# Patient Record
Sex: FEMALE | Race: WHITE
[De-identification: names, ages, dates, MRNs, and addresses within clinical notes are randomized per-mention and may not be internally consistent; named-entity substitution may affect disease eponyms.]

---

## 2024-01-24 ENCOUNTER — HOSPITAL ENCOUNTER (OUTPATIENT)
Dept: HOSPITAL 101 - LAB | Age: 29
Discharge: HOME | End: 2024-01-24
Payer: COMMERCIAL

## 2024-01-24 DIAGNOSIS — Z01.419: Primary | ICD-10-CM

## 2024-01-24 PROCEDURE — G0145 SCR C/V CYTO,THINLAYER,RESCR: HCPCS

## 2024-02-01 ENCOUNTER — HOSPITAL ENCOUNTER
Age: 29
Discharge: HOME | End: 2024-02-01
Payer: COMMERCIAL

## 2024-02-01 DIAGNOSIS — N63.0: Primary | ICD-10-CM

## 2024-02-01 PROCEDURE — 77066 DX MAMMO INCL CAD BI: CPT

## 2024-02-01 PROCEDURE — G0279 TOMOSYNTHESIS, MAMMO: HCPCS

## 2024-02-01 PROCEDURE — 76642 ULTRASOUND BREAST LIMITED: CPT

## 2024-03-28 NOTE — PROGRESS NOTES
[facility]  [address]  [place] [address]  Phone: [phone]  Fax: [phone]    Abeba Santiago is a 28 y.o. female who presents today for her medical conditions/complaintsas noted below.  Chief Complaint   Patient presents with    Fever     Tmax 103.7 with an ear thermometer. Fever started about 2 days ago.    Cough     Patient states cough started 6 days ago. Patient has not tried any meds other then tylenol. Patient has not tested herself for COVID.    Congestion        HPI:     HPI  She reports having worsening cough and fever   Started with cough  Yesterday fever  Tyelnol and Ibuprofen     Current Outpatient Medications   Medication Sig Dispense Refill    benzonatate (TESSALON) 100 MG capsule Take 1-2 capsules by mouth 3 times daily as needed for Cough 60 capsule 0     No current facility-administered medications for this visit.     No Known Allergies    Subjective:      Review of Systems   Constitutional:  Positive for chills, diaphoresis and fever.   HENT:  Positive for congestion, postnasal drip, sore throat and trouble swallowing.    Respiratory:  Positive for cough, shortness of breath and wheezing.    Gastrointestinal:  Negative for diarrhea, nausea and vomiting.   Musculoskeletal:  Negative for arthralgias and myalgias.   Skin:  Negative for rash.       Objective:     /68   Pulse (!) 117   Temp 99 °F (37.2 °C) (Oral)   Ht 1.575 m (5' 2.01\")   Wt 90.6 kg (199 lb 12.8 oz)   SpO2 99%   BMI 36.53 kg/m²   Physical Exam  Vitals and nursing note reviewed.   Constitutional:       Appearance: She is ill-appearing. She is not toxic-appearing.   HENT:      Right Ear: Tympanic membrane, ear canal and external ear normal.      Left Ear: Tympanic membrane, ear canal and external ear normal.      Nose: Congestion present.      Mouth/Throat:      Mouth: Mucous membranes are moist.      Pharynx: Posterior oropharyngeal erythema present.   Eyes:

## 2024-03-29 ENCOUNTER — OFFICE VISIT (OUTPATIENT)
Dept: PRIMARY CARE CLINIC | Age: 29
End: 2024-03-29

## 2024-03-29 VITALS
TEMPERATURE: 99 F | BODY MASS INDEX: 36.77 KG/M2 | SYSTOLIC BLOOD PRESSURE: 116 MMHG | HEART RATE: 117 BPM | HEIGHT: 62 IN | DIASTOLIC BLOOD PRESSURE: 68 MMHG | OXYGEN SATURATION: 99 % | WEIGHT: 199.8 LBS

## 2024-03-29 DIAGNOSIS — Z00.00 HEALTH CARE MAINTENANCE: ICD-10-CM

## 2024-03-29 DIAGNOSIS — J10.1 INFLUENZA B: Primary | ICD-10-CM

## 2024-03-29 DIAGNOSIS — R05.1 ACUTE COUGH: ICD-10-CM

## 2024-03-29 DIAGNOSIS — R50.9 FEVER, UNSPECIFIED FEVER CAUSE: ICD-10-CM

## 2024-03-29 LAB
INFLUENZA A ANTIGEN, POC: NEGATIVE
INFLUENZA B ANTIGEN, POC: POSITIVE
Lab: NORMAL
QC PASS/FAIL: NORMAL
SARS-COV-2 RDRP RESP QL NAA+PROBE: NEGATIVE
VALID INTERNAL CONTROL, POC: ABNORMAL

## 2024-03-29 RX ORDER — AZITHROMYCIN 250 MG/1
TABLET, FILM COATED ORAL
Qty: 1 PACKET | Refills: 0 | Status: SHIPPED | OUTPATIENT
Start: 2024-03-29 | End: 2024-04-08

## 2024-03-29 RX ORDER — BENZONATATE 100 MG/1
100-200 CAPSULE ORAL 3 TIMES DAILY PRN
Qty: 60 CAPSULE | Refills: 0 | Status: SHIPPED | OUTPATIENT
Start: 2024-03-29 | End: 2024-04-05

## 2024-03-29 ASSESSMENT — ENCOUNTER SYMPTOMS
SHORTNESS OF BREATH: 1
COUGH: 1
SORE THROAT: 1
TROUBLE SWALLOWING: 1
NAUSEA: 0
DIARRHEA: 0
VOMITING: 0
WHEEZING: 1

## 2024-05-13 ENCOUNTER — HOSPITAL ENCOUNTER
Dept: HOSPITAL 101 - US | Age: 29
Discharge: HOME | End: 2024-05-13
Payer: COMMERCIAL

## 2024-05-13 DIAGNOSIS — N92.6: Primary | ICD-10-CM

## 2024-05-13 DIAGNOSIS — Z3A.00: ICD-10-CM

## 2024-05-13 PROCEDURE — 84702 CHORIONIC GONADOTROPIN TEST: CPT

## 2024-05-13 PROCEDURE — 76817 TRANSVAGINAL US OBSTETRIC: CPT

## 2024-05-13 PROCEDURE — 36415 COLL VENOUS BLD VENIPUNCTURE: CPT

## 2024-05-15 ENCOUNTER — HOSPITAL ENCOUNTER
Dept: HOSPITAL 101 - LAB | Age: 29
Discharge: HOME | End: 2024-05-15
Payer: COMMERCIAL

## 2024-05-15 DIAGNOSIS — N92.6: ICD-10-CM

## 2024-05-15 DIAGNOSIS — Z00.00: Primary | ICD-10-CM

## 2024-05-15 DIAGNOSIS — Z00.00 HEALTH CARE MAINTENANCE: ICD-10-CM

## 2024-05-15 DIAGNOSIS — N92.6: Primary | ICD-10-CM

## 2024-05-15 LAB
ALANINE AMINOTRANSFERASE: 56 U/L (ref 14–59)
ALBUMIN GLOBULIN RATIO: 1.1
ALBUMIN LEVEL: 3.5 G/DL (ref 3.4–5)
ALBUMIN SERPL-MCNC: NORMAL G/DL
ALKALINE PHOSPHATASE: 70 U/L (ref 46–116)
ALP BLD-CCNC: NORMAL U/L
ALT SERPL-CCNC: NORMAL U/L
ANION GAP: 13
ASPARTATE AMINO TRANSFERASE: 30 U/L (ref 15–37)
AST SERPL-CCNC: NORMAL U/L
BILIRUB SERPL-MCNC: NORMAL MG/DL
BLOOD UREA NITROGEN: 8 MG/DL (ref 7–18)
BUN BLDV-MCNC: NORMAL MG/DL
CALCIUM SERPL-MCNC: NORMAL MG/DL
CALCIUM: 9 MG/DL (ref 8.5–10.1)
CARBON DIOXIDE: 25.8 MMOL/L (ref 21–32)
CHLORIDE BLD-SCNC: NORMAL MMOL/L
CHLORIDE: 102 MMOL/L (ref 98–107)
CHOLESTEROL, TOTAL: 149 MG/DL
CHOLESTEROL/HDL RATIO: 2.8
CHOLESTEROL: 149 MG/DL (ref ?–200)
CO2: NORMAL
CREAT SERPL-MCNC: NORMAL MG/DL
ESTIMATED GFR (AFRICAN AMERICA: >60 (ref 60–?)
ESTIMATED GFR (NON-AFRICAN AME: >60 (ref 60–?)
GLOBULIN: 3.2 G/DL
GLUCOSE FASTING: 89 MG/DL
GLUCOSE: 89 MG/DL (ref 74–106)
HDL CHOLESTEROL: 54 MG/DL (ref 40–60)
HDLC SERPL-MCNC: 54 MG/DL (ref 35–70)
LDL CHOLESTEROL CALCULATED: 84.4 MG/DL (ref 0–160)
NONHDLC SERPL-MCNC: NORMAL MG/DL
POTASSIUM SERPL-SCNC: NORMAL MMOL/L
POTASSIUM: 3.8 MMOL/L (ref 3.5–5.1)
SODIUM BLD-SCNC: NORMAL MMOL/L
SODIUM: 137 MMOL/L (ref 136–145)
TOTAL PROTEIN: 6.7 G/DL (ref 6.4–8.2)
TOTAL PROTEIN: NORMAL
TRIGL SERPL-MCNC: 53 MG/DL
TRIGLYCERIDES: 53 MG/DL (ref ?–150)
VLDL CHOLESTEROL: 10.6 MG/DL
VLDLC SERPL CALC-MCNC: 10.6 MG/DL

## 2024-05-15 PROCEDURE — 36415 COLL VENOUS BLD VENIPUNCTURE: CPT

## 2024-05-15 PROCEDURE — 80061 LIPID PANEL: CPT

## 2024-05-15 PROCEDURE — 84702 CHORIONIC GONADOTROPIN TEST: CPT

## 2024-05-15 PROCEDURE — 80053 COMPREHEN METABOLIC PANEL: CPT

## 2024-05-20 ENCOUNTER — HOSPITAL ENCOUNTER
Dept: HOSPITAL 101 - US | Age: 29
Discharge: HOME | End: 2024-05-20
Payer: COMMERCIAL

## 2024-05-20 DIAGNOSIS — Z3A.01: ICD-10-CM

## 2024-05-20 DIAGNOSIS — O20.9: Primary | ICD-10-CM

## 2024-05-20 DIAGNOSIS — O36.80X0: ICD-10-CM

## 2024-05-20 PROCEDURE — 76817 TRANSVAGINAL US OBSTETRIC: CPT

## 2024-06-20 ENCOUNTER — HOSPITAL ENCOUNTER
Dept: HOSPITAL 101 - LAB | Age: 29
Discharge: HOME | End: 2024-06-20
Payer: COMMERCIAL

## 2024-06-20 ENCOUNTER — HOSPITAL ENCOUNTER
Dept: HOSPITAL 101 - NOMS | Age: 29
Discharge: HOME | End: 2024-06-20
Payer: COMMERCIAL

## 2024-06-20 DIAGNOSIS — Z3A.12: ICD-10-CM

## 2024-06-20 DIAGNOSIS — Z34.91: Primary | ICD-10-CM

## 2024-06-20 DIAGNOSIS — Z36.0: ICD-10-CM

## 2024-06-20 LAB
ADD MANUAL DIFF: NO
HEMATOCRIT: 39.1 % (ref 36–48)
HEMOGLOBIN: 14 G/DL (ref 12–16)
IMMATURE GRANULOCYTES ABS AUTO: 0.03 10^3/UL (ref 0–0.03)
IMMATURE GRANULOCYTES PCT AUTO: 0.3 % (ref 0–0.5)
LYMPHOCYTES  ABSOLUTE AUTO: 2.3 10^3/UL (ref 1.2–3.8)
MCV RBC: 88.1 FL (ref 81–99)
MEAN CORPUSCULAR HEMOGLOBIN: 31.5 PG (ref 26.7–34)
MEAN CORPUSCULAR HGB CONC: 35.8 G/DL (ref 29.9–35.2)
PLATELET COUNT: 259 10^3/UL (ref 150–450)
RED BLOOD COUNT: 4.44 10^6/UL (ref 4.2–5.4)
WHITE BLOOD COUNT: 10.4 10^3/UL (ref 4–11)

## 2024-06-20 PROCEDURE — 87389 HIV-1 AG W/HIV-1&-2 AB AG IA: CPT

## 2024-06-20 PROCEDURE — 86850 RBC ANTIBODY SCREEN: CPT

## 2024-06-20 PROCEDURE — 87340 HEPATITIS B SURFACE AG IA: CPT

## 2024-06-20 PROCEDURE — 86900 BLOOD TYPING SEROLOGIC ABO: CPT

## 2024-06-20 PROCEDURE — 76817 TRANSVAGINAL US OBSTETRIC: CPT

## 2024-06-20 PROCEDURE — 36415 COLL VENOUS BLD VENIPUNCTURE: CPT

## 2024-06-20 PROCEDURE — 86901 BLOOD TYPING SEROLOGIC RH(D): CPT

## 2024-06-20 PROCEDURE — 83036 HEMOGLOBIN GLYCOSYLATED A1C: CPT

## 2024-06-20 PROCEDURE — 85025 COMPLETE CBC W/AUTO DIFF WBC: CPT

## 2024-06-20 PROCEDURE — 86762 RUBELLA ANTIBODY: CPT

## 2024-06-20 PROCEDURE — 86592 SYPHILIS TEST NON-TREP QUAL: CPT

## 2024-06-20 PROCEDURE — 86803 HEPATITIS C AB TEST: CPT

## 2024-06-20 PROCEDURE — 87086 URINE CULTURE/COLONY COUNT: CPT

## 2024-06-21 LAB — RUBELLA ANTIBODIES, IGG: 1.18 INDEX

## 2024-07-18 ENCOUNTER — HOSPITAL ENCOUNTER
Age: 29
Discharge: HOME | End: 2024-07-18
Payer: COMMERCIAL

## 2024-07-18 DIAGNOSIS — Z36.9: ICD-10-CM

## 2024-07-18 DIAGNOSIS — Z34.92: Primary | ICD-10-CM

## 2024-07-18 LAB — GLUCOSE 1 HOUR: 83 MG/DL (ref ?–130)

## 2024-07-18 PROCEDURE — 82950 GLUCOSE TEST: CPT

## 2024-07-18 PROCEDURE — 36415 COLL VENOUS BLD VENIPUNCTURE: CPT

## 2024-07-18 PROCEDURE — 82105 ALPHA-FETOPROTEIN SERUM: CPT

## 2024-08-15 ENCOUNTER — HOSPITAL ENCOUNTER
Dept: HOSPITAL 101 - NOMS | Age: 29
Discharge: HOME | End: 2024-08-15
Payer: COMMERCIAL

## 2024-08-15 DIAGNOSIS — Z3A.19: ICD-10-CM

## 2024-08-15 DIAGNOSIS — Z36.89: Primary | ICD-10-CM

## 2024-08-15 PROCEDURE — 76817 TRANSVAGINAL US OBSTETRIC: CPT

## 2024-08-15 PROCEDURE — 76805 OB US >/= 14 WKS SNGL FETUS: CPT

## 2024-09-26 ENCOUNTER — HOSPITAL ENCOUNTER
Dept: HOSPITAL 101 - LAB | Age: 29
Discharge: HOME | End: 2024-09-26
Payer: COMMERCIAL

## 2024-09-26 DIAGNOSIS — Z13.1: Primary | ICD-10-CM

## 2024-09-26 DIAGNOSIS — Z3A.25: ICD-10-CM

## 2024-09-26 LAB
ADD MANUAL DIFF: NO
GLUCOSE 1 HOUR: 152 MG/DL (ref ?–130)
HCT VFR BLD CALC: 38.2 % (ref 36–48)
HEMATOCRIT: 38.2 % (ref 36–48)
HEMOGLOBIN: 13.1 G/DL (ref 12–16)
IMMATURE GRANULOCYTES ABS AUTO: 0.12 10^3/UL (ref 0–0.03)
IMMATURE GRANULOCYTES PCT AUTO: 1.1 % (ref 0–0.5)
LYMPHOCYTES  ABSOLUTE AUTO: 2.1 10^3/UL (ref 1.2–3.8)
MCV RBC: 92.7 FL (ref 81–99)
MEAN CORPUSCULAR HEMOGLOBIN: 31.8 PG (ref 26.7–34)
MEAN CORPUSCULAR HGB CONC: 34.3 G/DL (ref 29.9–35.2)
MEAN CORPUSCULAR VOLUME: 92.7 FL (ref 81–99)
PLATELET # BLD: 254 10^3/UL (ref 150–450)
PLATELET COUNT: 254 10^3/UL (ref 150–450)
RED BLOOD COUNT: 4.12 10^6/UL (ref 4.2–5.4)
WBC # BLD: 10.9 10^3/UL (ref 4–11)
WHITE BLOOD COUNT: 10.9 10^3/UL (ref 4–11)

## 2024-09-26 PROCEDURE — 36415 COLL VENOUS BLD VENIPUNCTURE: CPT

## 2024-09-26 PROCEDURE — 85025 COMPLETE CBC W/AUTO DIFF WBC: CPT

## 2024-09-26 PROCEDURE — 82950 GLUCOSE TEST: CPT

## 2024-10-03 ENCOUNTER — HOSPITAL ENCOUNTER
Dept: HOSPITAL 101 - LAB | Age: 29
Discharge: HOME | End: 2024-10-03
Payer: COMMERCIAL

## 2024-10-03 DIAGNOSIS — R73.09: Primary | ICD-10-CM

## 2024-10-03 LAB
GLUCOSE 1 HOUR: 173 MG/DL (ref ?–180)
GLUCOSE 2 HOUR: 162 MG/DL (ref ?–155)
GLUCOSE 3 HOUR: 114 MG/DL (ref ?–140)

## 2024-10-03 PROCEDURE — 36415 COLL VENOUS BLD VENIPUNCTURE: CPT

## 2024-10-03 PROCEDURE — 82951 GLUCOSE TOLERANCE TEST (GTT): CPT

## 2024-10-03 PROCEDURE — 82952 GTT-ADDED SAMPLES: CPT

## 2024-10-24 ENCOUNTER — HOSPITAL ENCOUNTER
Dept: HOSPITAL 101 - NOMS | Age: 29
Discharge: HOME | End: 2024-10-24
Payer: COMMERCIAL

## 2024-10-24 DIAGNOSIS — Z3A.30: ICD-10-CM

## 2024-10-24 DIAGNOSIS — O26.843: Primary | ICD-10-CM

## 2024-10-24 PROCEDURE — 76816 OB US FOLLOW-UP PER FETUS: CPT

## 2024-11-04 ENCOUNTER — HOSPITAL ENCOUNTER
Dept: HOSPITAL 101 - FBCO | Age: 29
Discharge: HOME | End: 2024-11-04
Payer: COMMERCIAL

## 2024-11-04 VITALS — DIASTOLIC BLOOD PRESSURE: 60 MMHG | HEART RATE: 90 BPM | SYSTOLIC BLOOD PRESSURE: 114 MMHG

## 2024-11-04 DIAGNOSIS — O47.03: Primary | ICD-10-CM

## 2024-11-04 PROCEDURE — 59025 FETAL NON-STRESS TEST: CPT

## 2024-11-07 ENCOUNTER — HOSPITAL ENCOUNTER
Dept: HOSPITAL 101 - US | Age: 29
Discharge: HOME | End: 2024-11-07
Payer: COMMERCIAL

## 2024-11-07 VITALS — DIASTOLIC BLOOD PRESSURE: 74 MMHG | SYSTOLIC BLOOD PRESSURE: 130 MMHG | HEART RATE: 89 BPM

## 2024-11-07 DIAGNOSIS — Z3A.32: ICD-10-CM

## 2024-11-07 DIAGNOSIS — O24.419: Primary | ICD-10-CM

## 2024-11-07 PROCEDURE — 76816 OB US FOLLOW-UP PER FETUS: CPT

## 2024-11-07 PROCEDURE — 76818 FETAL BIOPHYS PROFILE W/NST: CPT

## 2024-11-11 ENCOUNTER — HOSPITAL ENCOUNTER
Dept: HOSPITAL 101 - FBCO | Age: 29
Discharge: HOME | End: 2024-11-11
Payer: COMMERCIAL

## 2024-11-11 VITALS — SYSTOLIC BLOOD PRESSURE: 122 MMHG | DIASTOLIC BLOOD PRESSURE: 72 MMHG | HEART RATE: 104 BPM

## 2024-11-11 DIAGNOSIS — O24.419: Primary | ICD-10-CM

## 2024-11-11 PROCEDURE — 59025 FETAL NON-STRESS TEST: CPT

## 2024-11-14 ENCOUNTER — HOSPITAL ENCOUNTER
Dept: HOSPITAL 101 - US | Age: 29
Discharge: HOME | End: 2024-11-14
Payer: COMMERCIAL

## 2024-11-14 VITALS — HEART RATE: 92 BPM | DIASTOLIC BLOOD PRESSURE: 59 MMHG | SYSTOLIC BLOOD PRESSURE: 110 MMHG

## 2024-11-14 DIAGNOSIS — Z3A.33: ICD-10-CM

## 2024-11-14 DIAGNOSIS — O24.419: Primary | ICD-10-CM

## 2024-11-14 PROCEDURE — 76818 FETAL BIOPHYS PROFILE W/NST: CPT

## 2024-11-18 ENCOUNTER — HOSPITAL ENCOUNTER
Dept: HOSPITAL 101 - FBCO | Age: 29
Discharge: HOME | End: 2024-11-18
Payer: COMMERCIAL

## 2024-11-18 VITALS — DIASTOLIC BLOOD PRESSURE: 69 MMHG | SYSTOLIC BLOOD PRESSURE: 126 MMHG | HEART RATE: 81 BPM

## 2024-11-18 DIAGNOSIS — O24.419: Primary | ICD-10-CM

## 2024-11-18 DIAGNOSIS — Z3A.34: ICD-10-CM

## 2024-11-18 PROCEDURE — 59025 FETAL NON-STRESS TEST: CPT

## 2024-11-21 ENCOUNTER — HOSPITAL ENCOUNTER
Dept: HOSPITAL 101 - US | Age: 29
Discharge: HOME | End: 2024-11-21
Payer: COMMERCIAL

## 2024-11-21 VITALS — DIASTOLIC BLOOD PRESSURE: 74 MMHG | SYSTOLIC BLOOD PRESSURE: 121 MMHG | HEART RATE: 100 BPM

## 2024-11-21 DIAGNOSIS — Z3A.34: ICD-10-CM

## 2024-11-21 DIAGNOSIS — O24.419: Primary | ICD-10-CM

## 2024-11-21 PROCEDURE — 76818 FETAL BIOPHYS PROFILE W/NST: CPT

## 2024-11-25 ENCOUNTER — HOSPITAL ENCOUNTER
Dept: HOSPITAL 101 - FBCO | Age: 29
Discharge: HOME | End: 2024-11-25
Payer: COMMERCIAL

## 2024-11-25 VITALS — HEART RATE: 95 BPM | DIASTOLIC BLOOD PRESSURE: 76 MMHG | SYSTOLIC BLOOD PRESSURE: 141 MMHG

## 2024-11-25 DIAGNOSIS — O24.419: Primary | ICD-10-CM

## 2024-11-25 DIAGNOSIS — Z3A.35: ICD-10-CM

## 2024-11-25 PROCEDURE — 59025 FETAL NON-STRESS TEST: CPT

## 2024-11-27 ENCOUNTER — HOSPITAL ENCOUNTER
Dept: HOSPITAL 101 - US | Age: 29
Discharge: HOME | End: 2024-11-27
Payer: COMMERCIAL

## 2024-11-27 VITALS — SYSTOLIC BLOOD PRESSURE: 110 MMHG | HEART RATE: 92 BPM | DIASTOLIC BLOOD PRESSURE: 68 MMHG

## 2024-11-27 DIAGNOSIS — Z3A.35: ICD-10-CM

## 2024-11-27 DIAGNOSIS — O24.419: Primary | ICD-10-CM

## 2024-11-27 PROCEDURE — 76818 FETAL BIOPHYS PROFILE W/NST: CPT

## 2024-12-02 ENCOUNTER — HOSPITAL ENCOUNTER
Dept: HOSPITAL 101 - FBCO | Age: 29
Discharge: HOME | End: 2024-12-02
Payer: COMMERCIAL

## 2024-12-02 VITALS — DIASTOLIC BLOOD PRESSURE: 87 MMHG | SYSTOLIC BLOOD PRESSURE: 135 MMHG | HEART RATE: 96 BPM

## 2024-12-02 DIAGNOSIS — O24.419: Primary | ICD-10-CM

## 2024-12-02 DIAGNOSIS — Z3A.36: ICD-10-CM

## 2024-12-02 PROCEDURE — 59025 FETAL NON-STRESS TEST: CPT

## 2024-12-04 ENCOUNTER — HOSPITAL ENCOUNTER
Dept: HOSPITAL 101 - RAD | Age: 29
Discharge: HOME | End: 2024-12-04
Payer: COMMERCIAL

## 2024-12-04 DIAGNOSIS — Z3A.36: ICD-10-CM

## 2024-12-04 DIAGNOSIS — O26.843: Primary | ICD-10-CM

## 2024-12-04 DIAGNOSIS — O24.419: ICD-10-CM

## 2024-12-04 PROCEDURE — 76816 OB US FOLLOW-UP PER FETUS: CPT

## 2024-12-05 ENCOUNTER — HOSPITAL ENCOUNTER
Dept: HOSPITAL 101 - FBCO | Age: 29
Discharge: HOME | End: 2024-12-05
Payer: COMMERCIAL

## 2024-12-05 ENCOUNTER — HOSPITAL ENCOUNTER (OUTPATIENT)
Dept: HOSPITAL 101 - LAB | Age: 29
Discharge: HOME | End: 2024-12-05
Payer: COMMERCIAL

## 2024-12-05 VITALS — HEART RATE: 90 BPM | SYSTOLIC BLOOD PRESSURE: 127 MMHG | DIASTOLIC BLOOD PRESSURE: 71 MMHG

## 2024-12-05 DIAGNOSIS — O24.419: Primary | ICD-10-CM

## 2024-12-05 DIAGNOSIS — Z34.93: Primary | ICD-10-CM

## 2024-12-05 DIAGNOSIS — Z3A.36: ICD-10-CM

## 2024-12-05 PROCEDURE — 87150 DNA/RNA AMPLIFIED PROBE: CPT

## 2024-12-05 PROCEDURE — 87081 CULTURE SCREEN ONLY: CPT

## 2024-12-05 PROCEDURE — 76818 FETAL BIOPHYS PROFILE W/NST: CPT

## 2024-12-09 ENCOUNTER — HOSPITAL ENCOUNTER
Dept: HOSPITAL 101 - FBCO | Age: 29
Discharge: HOME | End: 2024-12-09
Payer: COMMERCIAL

## 2024-12-09 VITALS — HEART RATE: 103 BPM | DIASTOLIC BLOOD PRESSURE: 76 MMHG | SYSTOLIC BLOOD PRESSURE: 122 MMHG

## 2024-12-09 DIAGNOSIS — Z3A.37: ICD-10-CM

## 2024-12-09 DIAGNOSIS — O24.419: Primary | ICD-10-CM

## 2024-12-09 PROCEDURE — 59025 FETAL NON-STRESS TEST: CPT

## 2024-12-12 ENCOUNTER — HOSPITAL ENCOUNTER
Dept: HOSPITAL 101 - US | Age: 29
Discharge: HOME | End: 2024-12-12
Payer: COMMERCIAL

## 2024-12-12 VITALS — SYSTOLIC BLOOD PRESSURE: 123 MMHG | HEART RATE: 88 BPM | DIASTOLIC BLOOD PRESSURE: 72 MMHG

## 2024-12-12 DIAGNOSIS — O24.419: Primary | ICD-10-CM

## 2024-12-12 DIAGNOSIS — Z3A.37: ICD-10-CM

## 2024-12-12 PROCEDURE — 76818 FETAL BIOPHYS PROFILE W/NST: CPT

## 2024-12-16 ENCOUNTER — HOSPITAL ENCOUNTER (INPATIENT)
Age: 29
LOS: 1 days | Discharge: HOME | End: 2024-12-17
Payer: COMMERCIAL

## 2024-12-16 VITALS — DIASTOLIC BLOOD PRESSURE: 79 MMHG | SYSTOLIC BLOOD PRESSURE: 131 MMHG | HEART RATE: 90 BPM

## 2024-12-16 VITALS — DIASTOLIC BLOOD PRESSURE: 67 MMHG | SYSTOLIC BLOOD PRESSURE: 119 MMHG | HEART RATE: 85 BPM

## 2024-12-16 VITALS — SYSTOLIC BLOOD PRESSURE: 131 MMHG | HEART RATE: 116 BPM | DIASTOLIC BLOOD PRESSURE: 70 MMHG

## 2024-12-16 VITALS — SYSTOLIC BLOOD PRESSURE: 128 MMHG | DIASTOLIC BLOOD PRESSURE: 76 MMHG | HEART RATE: 87 BPM

## 2024-12-16 VITALS — HEART RATE: 85 BPM | SYSTOLIC BLOOD PRESSURE: 115 MMHG | DIASTOLIC BLOOD PRESSURE: 72 MMHG

## 2024-12-16 VITALS — SYSTOLIC BLOOD PRESSURE: 115 MMHG | HEART RATE: 91 BPM | DIASTOLIC BLOOD PRESSURE: 67 MMHG

## 2024-12-16 VITALS — DIASTOLIC BLOOD PRESSURE: 72 MMHG | HEART RATE: 83 BPM | SYSTOLIC BLOOD PRESSURE: 115 MMHG

## 2024-12-16 VITALS — SYSTOLIC BLOOD PRESSURE: 105 MMHG | DIASTOLIC BLOOD PRESSURE: 59 MMHG | HEART RATE: 99 BPM

## 2024-12-16 VITALS — HEART RATE: 82 BPM | DIASTOLIC BLOOD PRESSURE: 71 MMHG | SYSTOLIC BLOOD PRESSURE: 109 MMHG

## 2024-12-16 VITALS — SYSTOLIC BLOOD PRESSURE: 100 MMHG | DIASTOLIC BLOOD PRESSURE: 58 MMHG | HEART RATE: 106 BPM

## 2024-12-16 VITALS — SYSTOLIC BLOOD PRESSURE: 112 MMHG | HEART RATE: 83 BPM | DIASTOLIC BLOOD PRESSURE: 75 MMHG

## 2024-12-16 VITALS — HEART RATE: 90 BPM | DIASTOLIC BLOOD PRESSURE: 80 MMHG | SYSTOLIC BLOOD PRESSURE: 131 MMHG

## 2024-12-16 VITALS — DIASTOLIC BLOOD PRESSURE: 72 MMHG | SYSTOLIC BLOOD PRESSURE: 120 MMHG | HEART RATE: 116 BPM

## 2024-12-16 VITALS — SYSTOLIC BLOOD PRESSURE: 115 MMHG | DIASTOLIC BLOOD PRESSURE: 66 MMHG | HEART RATE: 75 BPM

## 2024-12-16 VITALS — SYSTOLIC BLOOD PRESSURE: 111 MMHG | HEART RATE: 83 BPM | DIASTOLIC BLOOD PRESSURE: 69 MMHG

## 2024-12-16 VITALS — SYSTOLIC BLOOD PRESSURE: 120 MMHG | DIASTOLIC BLOOD PRESSURE: 68 MMHG | HEART RATE: 96 BPM | TEMPERATURE: 99.14 F

## 2024-12-16 VITALS — SYSTOLIC BLOOD PRESSURE: 119 MMHG | HEART RATE: 97 BPM | DIASTOLIC BLOOD PRESSURE: 61 MMHG

## 2024-12-16 VITALS — HEART RATE: 121 BPM | DIASTOLIC BLOOD PRESSURE: 58 MMHG | SYSTOLIC BLOOD PRESSURE: 150 MMHG

## 2024-12-16 VITALS — HEART RATE: 100 BPM | DIASTOLIC BLOOD PRESSURE: 50 MMHG | SYSTOLIC BLOOD PRESSURE: 87 MMHG

## 2024-12-16 VITALS — SYSTOLIC BLOOD PRESSURE: 104 MMHG | DIASTOLIC BLOOD PRESSURE: 61 MMHG | HEART RATE: 93 BPM

## 2024-12-16 VITALS — SYSTOLIC BLOOD PRESSURE: 123 MMHG | DIASTOLIC BLOOD PRESSURE: 82 MMHG | HEART RATE: 90 BPM

## 2024-12-16 VITALS — DIASTOLIC BLOOD PRESSURE: 82 MMHG | HEART RATE: 83 BPM | SYSTOLIC BLOOD PRESSURE: 122 MMHG

## 2024-12-16 VITALS — SYSTOLIC BLOOD PRESSURE: 125 MMHG | HEART RATE: 81 BPM | DIASTOLIC BLOOD PRESSURE: 83 MMHG

## 2024-12-16 VITALS — SYSTOLIC BLOOD PRESSURE: 115 MMHG | DIASTOLIC BLOOD PRESSURE: 68 MMHG | HEART RATE: 95 BPM

## 2024-12-16 VITALS — SYSTOLIC BLOOD PRESSURE: 140 MMHG | HEART RATE: 82 BPM | TEMPERATURE: 97.2 F | DIASTOLIC BLOOD PRESSURE: 83 MMHG

## 2024-12-16 VITALS — HEART RATE: 102 BPM | DIASTOLIC BLOOD PRESSURE: 69 MMHG | SYSTOLIC BLOOD PRESSURE: 110 MMHG

## 2024-12-16 VITALS — SYSTOLIC BLOOD PRESSURE: 126 MMHG | HEART RATE: 104 BPM | DIASTOLIC BLOOD PRESSURE: 74 MMHG

## 2024-12-16 VITALS — HEART RATE: 85 BPM | DIASTOLIC BLOOD PRESSURE: 74 MMHG | SYSTOLIC BLOOD PRESSURE: 123 MMHG

## 2024-12-16 VITALS — HEART RATE: 96 BPM | SYSTOLIC BLOOD PRESSURE: 113 MMHG | DIASTOLIC BLOOD PRESSURE: 67 MMHG

## 2024-12-16 VITALS — TEMPERATURE: 97.7 F

## 2024-12-16 VITALS — DIASTOLIC BLOOD PRESSURE: 62 MMHG | SYSTOLIC BLOOD PRESSURE: 110 MMHG | HEART RATE: 85 BPM

## 2024-12-16 VITALS — DIASTOLIC BLOOD PRESSURE: 70 MMHG | SYSTOLIC BLOOD PRESSURE: 151 MMHG | HEART RATE: 112 BPM

## 2024-12-16 VITALS — DIASTOLIC BLOOD PRESSURE: 82 MMHG | HEART RATE: 96 BPM | SYSTOLIC BLOOD PRESSURE: 123 MMHG

## 2024-12-16 VITALS — HEART RATE: 108 BPM | DIASTOLIC BLOOD PRESSURE: 54 MMHG | SYSTOLIC BLOOD PRESSURE: 107 MMHG

## 2024-12-16 VITALS — DIASTOLIC BLOOD PRESSURE: 76 MMHG | HEART RATE: 90 BPM | SYSTOLIC BLOOD PRESSURE: 114 MMHG

## 2024-12-16 VITALS — TEMPERATURE: 97.34 F

## 2024-12-16 DIAGNOSIS — Z3A.38: ICD-10-CM

## 2024-12-16 LAB
CANNABINOID SCREEN URINE: NEGATIVE
HCT VFR BLD CALC: 37.8 % (ref 36–48)
HEMATOCRIT: 37.8 % (ref 36–48)
HEMOGLOBIN: 12.9 G/DL (ref 12–16)
MCV RBC: 89.8 FL (ref 81–99)
MEAN CORPUSCULAR HEMOGLOBIN: 30.6 PG (ref 26.7–34)
MEAN CORPUSCULAR HGB CONC: 34.1 G/DL (ref 29.9–35.2)
MEAN CORPUSCULAR VOLUME: 89.8 FL (ref 81–99)
METHAMPHETAMINES SCREEN URINE: NEGATIVE
PLATELET # BLD: 226 10^3/UL (ref 150–450)
PLATELET COUNT: 226 10^3/UL (ref 150–450)
RED BLOOD COUNT: 4.21 10^6/UL (ref 4.2–5.4)
TRICYCLIC ANTIDEPRESSANT URINE: NEGATIVE
WBC # BLD: 12.1 10^3/UL (ref 4–11)
WHITE BLOOD COUNT: 12.1 10^3/UL (ref 4–11)

## 2024-12-16 PROCEDURE — 59050 FETAL MONITOR W/REPORT: CPT

## 2024-12-16 PROCEDURE — 59410 OBSTETRICAL CARE: CPT

## 2024-12-16 PROCEDURE — 86850 RBC ANTIBODY SCREEN: CPT

## 2024-12-16 PROCEDURE — 85025 COMPLETE CBC W/AUTO DIFF WBC: CPT

## 2024-12-16 PROCEDURE — 85027 COMPLETE CBC AUTOMATED: CPT

## 2024-12-16 PROCEDURE — 86901 BLOOD TYPING SEROLOGIC RH(D): CPT

## 2024-12-16 PROCEDURE — 80307 DRUG TEST PRSMV CHEM ANLYZR: CPT

## 2024-12-16 PROCEDURE — 86900 BLOOD TYPING SEROLOGIC ABO: CPT

## 2024-12-16 PROCEDURE — 82948 REAGENT STRIP/BLOOD GLUCOSE: CPT

## 2024-12-16 PROCEDURE — 36415 COLL VENOUS BLD VENIPUNCTURE: CPT

## 2024-12-17 VITALS — HEART RATE: 85 BPM | DIASTOLIC BLOOD PRESSURE: 70 MMHG | SYSTOLIC BLOOD PRESSURE: 109 MMHG | TEMPERATURE: 98.06 F

## 2024-12-17 LAB
ADD MANUAL DIFF: NO
HCT VFR BLD CALC: 31.8 % (ref 36–48)
HEMATOCRIT: 31.8 % (ref 36–48)
HEMOGLOBIN: 10.7 G/DL (ref 12–16)
IMMATURE GRANULOCYTES ABS AUTO: 0.21 10^3/UL (ref 0–0.03)
IMMATURE GRANULOCYTES PCT AUTO: 1.4 % (ref 0–0.5)
LYMPHOCYTES  ABSOLUTE AUTO: 2.3 10^3/UL (ref 1.2–3.8)
MCV RBC: 90.3 FL (ref 81–99)
MEAN CORPUSCULAR HEMOGLOBIN: 30.4 PG (ref 26.7–34)
MEAN CORPUSCULAR HGB CONC: 33.6 G/DL (ref 29.9–35.2)
MEAN CORPUSCULAR VOLUME: 90.3 FL (ref 81–99)
PLATELET # BLD: 187 10^3/UL (ref 150–450)
PLATELET COUNT: 187 10^3/UL (ref 150–450)
RED BLOOD COUNT: 3.52 10^6/UL (ref 4.2–5.4)
WBC # BLD: 15.5 10^3/UL (ref 4–11)
WHITE BLOOD COUNT: 15.5 10^3/UL (ref 4–11)

## 2024-12-20 ENCOUNTER — HOSPITAL ENCOUNTER
Age: 29
Discharge: HOME | End: 2024-12-20
Payer: COMMERCIAL

## 2024-12-20 VITALS
HEART RATE: 86 BPM | OXYGEN SATURATION: 98 % | SYSTOLIC BLOOD PRESSURE: 130 MMHG | TEMPERATURE: 97.88 F | DIASTOLIC BLOOD PRESSURE: 86 MMHG

## 2025-01-15 ENCOUNTER — HOSPITAL ENCOUNTER
Dept: HOSPITAL 101 - FBCO | Age: 30
Discharge: HOME | End: 2025-01-15
Payer: COMMERCIAL

## 2025-01-23 ENCOUNTER — HOSPITAL ENCOUNTER
Dept: HOSPITAL 101 - FBCO | Age: 30
Discharge: HOME | End: 2025-01-23
Payer: COMMERCIAL

## 2025-02-19 ENCOUNTER — HOSPITAL ENCOUNTER (OUTPATIENT)
Dept: HOSPITAL 101 - LAB | Age: 30
Discharge: HOME | End: 2025-02-19
Payer: COMMERCIAL

## 2025-02-19 DIAGNOSIS — Z01.419: Primary | ICD-10-CM

## 2025-02-19 PROCEDURE — 88175 CYTOPATH C/V AUTO FLUID REDO: CPT

## 2025-04-22 ENCOUNTER — PATIENT MESSAGE (OUTPATIENT)
Dept: PRIMARY CARE CLINIC | Age: 30
End: 2025-04-22

## 2025-04-22 DIAGNOSIS — Z02.0 SCHOOL PHYSICAL EXAM: ICD-10-CM

## 2025-04-22 DIAGNOSIS — Z00.00 HEALTH CARE MAINTENANCE: Primary | ICD-10-CM

## 2025-06-16 ENCOUNTER — RESULTS FOLLOW-UP (OUTPATIENT)
Dept: PRIMARY CARE CLINIC | Age: 30
End: 2025-06-16

## 2025-06-16 LAB — HEP B S AGB SURF AG: NONREACTIVE

## 2025-06-16 ASSESSMENT — PATIENT HEALTH QUESTIONNAIRE - PHQ9
SUM OF ALL RESPONSES TO PHQ9 QUESTIONS 1 & 2: 0
SUM OF ALL RESPONSES TO PHQ QUESTIONS 1-9: 0
1. LITTLE INTEREST OR PLEASURE IN DOING THINGS: NOT AT ALL
SUM OF ALL RESPONSES TO PHQ QUESTIONS 1-9: 0
2. FEELING DOWN, DEPRESSED OR HOPELESS: NOT AT ALL
2. FEELING DOWN, DEPRESSED OR HOPELESS: NOT AT ALL
1. LITTLE INTEREST OR PLEASURE IN DOING THINGS: NOT AT ALL

## 2025-06-17 ENCOUNTER — RESULTS FOLLOW-UP (OUTPATIENT)
Dept: PRIMARY CARE CLINIC | Age: 30
End: 2025-06-17

## 2025-06-17 DIAGNOSIS — Z00.00 HEALTH CARE MAINTENANCE: ICD-10-CM

## 2025-06-17 DIAGNOSIS — Z02.0 SCHOOL PHYSICAL EXAM: ICD-10-CM

## 2025-06-17 ASSESSMENT — ENCOUNTER SYMPTOMS
NAUSEA: 0
CONSTIPATION: 0
DIARRHEA: 0
TROUBLE SWALLOWING: 0
COUGH: 0
ABDOMINAL PAIN: 0
BACK PAIN: 0
SHORTNESS OF BREATH: 0
VOMITING: 0
EYE PAIN: 0
CHEST TIGHTNESS: 0
BLOOD IN STOOL: 0
EYE ITCHING: 0
VOICE CHANGE: 0

## 2025-06-18 NOTE — PROGRESS NOTES
[facility]  [address]  [place] [address]  Phone: [phone]  Fax: [phone]    Abeba Santiago is a 30 y.o. female who presents today for her medical conditions/complaintsas noted below.  Abeba Santiago is c/o of   Chief Complaint   Patient presents with    Annual Exam     Patient is here for her physical.    .    HPI:   School physical for nursing program at [employer]  Feels well  Her vaccine titers are all showing immunity--Hep B antibody was not done.     Would like Quantiferon Gold TB rather than Mantoux    Last pap: See OB --Dr. Bhatt    Past Medical History:   Diagnosis Date    Anxiety       History reviewed. No pertinent surgical history.  Family History   Problem Relation Age of Onset    Diabetes Father     Diabetes Paternal Grandfather     Mental Illness Mother      Social History     Tobacco Use    Smoking status: Never    Smokeless tobacco: Never   Substance Use Topics    Alcohol use: Yes     Alcohol/week: 1.0 standard drink of alcohol     Types: 1 Glasses of wine per week     Comment: Drink on weekend every other weekend      Current Outpatient Medications   Medication Sig Dispense Refill    Prenatal MV-Min-Fe Fum-FA-DHA (PRENATAL 1 PO) Take by mouth       No current facility-administered medications for this visit.     Allergies   Allergen Reactions    Cat Dander Hives, Itching and Rash       Health Maintenance   Topic Date Due    Hepatitis A vaccine (2 of 2 - 2-dose series) 02/15/2012    Hepatitis C screen  Never done    COVID-19 Vaccine (1 - 2024-25 season) Never done    Flu vaccine (Season Ended) 08/01/2025    Depression Screen  06/16/2026    DTaP/Tdap/Td vaccine (7 - Td or Tdap) 07/31/2027    Pap smear  02/13/2028    Hepatitis B vaccine  Completed    Hib vaccine  Completed    HPV vaccine  Completed    Polio vaccine  Completed    Meningococcal (ACWY) vaccine  Completed    HIV screen  Completed    Meningococcal B vaccine  Aged Out    Pneumococcal

## 2025-06-19 ENCOUNTER — OFFICE VISIT (OUTPATIENT)
Dept: PRIMARY CARE CLINIC | Age: 30
End: 2025-06-19
Payer: COMMERCIAL

## 2025-06-19 VITALS
WEIGHT: 197 LBS | OXYGEN SATURATION: 99 % | DIASTOLIC BLOOD PRESSURE: 68 MMHG | HEART RATE: 75 BPM | HEIGHT: 62 IN | SYSTOLIC BLOOD PRESSURE: 110 MMHG | BODY MASS INDEX: 36.25 KG/M2

## 2025-06-19 DIAGNOSIS — Z02.0 SCHOOL PHYSICAL EXAM: ICD-10-CM

## 2025-06-19 DIAGNOSIS — Z00.00 HEALTH CARE MAINTENANCE: ICD-10-CM

## 2025-06-19 DIAGNOSIS — Z00.00 HEALTH CARE MAINTENANCE: Primary | ICD-10-CM

## 2025-06-19 LAB — HBV SURFACE AB TITR SER: <3.5 MIU/ML

## 2025-06-19 PROCEDURE — 99395 PREV VISIT EST AGE 18-39: CPT | Performed by: PHYSICIAN ASSISTANT

## 2025-06-19 SDOH — ECONOMIC STABILITY: FOOD INSECURITY: WITHIN THE PAST 12 MONTHS, YOU WORRIED THAT YOUR FOOD WOULD RUN OUT BEFORE YOU GOT MONEY TO BUY MORE.: NEVER TRUE

## 2025-06-19 SDOH — ECONOMIC STABILITY: FOOD INSECURITY: WITHIN THE PAST 12 MONTHS, THE FOOD YOU BOUGHT JUST DIDN'T LAST AND YOU DIDN'T HAVE MONEY TO GET MORE.: NEVER TRUE

## 2025-06-20 ENCOUNTER — RESULTS FOLLOW-UP (OUTPATIENT)
Dept: PRIMARY CARE CLINIC | Age: 30
End: 2025-06-20

## 2025-06-20 LAB
QUANTI TB1 MINUS NIL: 0.04 IU/ML (ref 0–0.34)
QUANTI TB2 MINUS NIL: 0.04 IU/ML (ref 0–0.34)
QUANTIFERON MITOGEN MINUS NIL: 9.97 IU/ML
QUANTIFERON NIL: 0.03 IU/ML
QUANTIFERON TB INTERPRETATION: NEGATIVE IU/ML

## 2025-06-25 ENCOUNTER — TELEPHONE (OUTPATIENT)
Dept: PRIMARY CARE CLINIC | Age: 30
End: 2025-06-25

## 2025-06-27 ENCOUNTER — CLINICAL SUPPORT (OUTPATIENT)
Dept: PRIMARY CARE CLINIC | Age: 30
End: 2025-06-27
Payer: COMMERCIAL

## 2025-06-27 VITALS
HEIGHT: 62 IN | HEART RATE: 85 BPM | BODY MASS INDEX: 36.1 KG/M2 | SYSTOLIC BLOOD PRESSURE: 110 MMHG | DIASTOLIC BLOOD PRESSURE: 72 MMHG | WEIGHT: 196.2 LBS | OXYGEN SATURATION: 99 %

## 2025-06-27 DIAGNOSIS — Z23 NEED FOR VACCINATION: Primary | ICD-10-CM

## 2025-06-27 PROCEDURE — 90471 IMMUNIZATION ADMIN: CPT | Performed by: PHYSICIAN ASSISTANT

## 2025-06-27 PROCEDURE — 90746 HEPB VACCINE 3 DOSE ADULT IM: CPT | Performed by: PHYSICIAN ASSISTANT

## 2025-06-27 RX ORDER — IBUPROFEN 800 MG/1
TABLET, FILM COATED ORAL
COMMUNITY
Start: 2024-12-17

## 2025-06-27 ASSESSMENT — PATIENT HEALTH QUESTIONNAIRE - PHQ9
SUM OF ALL RESPONSES TO PHQ QUESTIONS 1-9: 0
1. LITTLE INTEREST OR PLEASURE IN DOING THINGS: NOT AT ALL
2. FEELING DOWN, DEPRESSED OR HOPELESS: NOT AT ALL

## 2025-08-21 ENCOUNTER — CLINICAL SUPPORT (OUTPATIENT)
Dept: PRIMARY CARE CLINIC | Age: 30
End: 2025-08-21
Payer: COMMERCIAL

## 2025-08-21 VITALS — OXYGEN SATURATION: 98 % | DIASTOLIC BLOOD PRESSURE: 82 MMHG | HEART RATE: 90 BPM | SYSTOLIC BLOOD PRESSURE: 120 MMHG

## 2025-08-21 DIAGNOSIS — Z23 NEED FOR HEPATITIS B VACCINATION: Primary | ICD-10-CM

## 2025-08-21 PROCEDURE — 90471 IMMUNIZATION ADMIN: CPT | Performed by: PHYSICIAN ASSISTANT

## 2025-08-21 PROCEDURE — 90746 HEPB VACCINE 3 DOSE ADULT IM: CPT | Performed by: PHYSICIAN ASSISTANT
